# Patient Record
Sex: FEMALE | Race: WHITE | URBAN - METROPOLITAN AREA
[De-identification: names, ages, dates, MRNs, and addresses within clinical notes are randomized per-mention and may not be internally consistent; named-entity substitution may affect disease eponyms.]

---

## 2020-09-23 ENCOUNTER — WEB ENCOUNTER (OUTPATIENT)
Dept: URBAN - METROPOLITAN AREA CLINIC 13 | Facility: CLINIC | Age: 45
End: 2020-09-23

## 2020-09-25 ENCOUNTER — OFFICE VISIT (OUTPATIENT)
Dept: URBAN - METROPOLITAN AREA CLINIC 113 | Facility: CLINIC | Age: 45
End: 2020-09-25
Payer: COMMERCIAL

## 2020-09-25 VITALS
BODY MASS INDEX: 21.66 KG/M2 | HEART RATE: 88 BPM | TEMPERATURE: 98.6 F | DIASTOLIC BLOOD PRESSURE: 68 MMHG | SYSTOLIC BLOOD PRESSURE: 100 MMHG | HEIGHT: 65 IN | WEIGHT: 130 LBS

## 2020-09-25 DIAGNOSIS — K59.02 OBSTRUCTIVE DEFECATION: ICD-10-CM

## 2020-09-25 PROCEDURE — 99205 OFFICE O/P NEW HI 60 MIN: CPT | Performed by: INTERNAL MEDICINE

## 2020-09-25 RX ORDER — HYDROCORTISONE ACETATE 25 MG/1
1 APPLICATORFUL SUPPOSITORY RECTAL TWICE DAILY
Status: ACTIVE | COMMUNITY

## 2020-09-25 RX ORDER — PREGABALIN 75 MG/1
1 CAPSULE CAPSULE ORAL TWICE DAILY
Status: ACTIVE | COMMUNITY

## 2020-09-25 NOTE — HPI-TODAY'S VISIT:
Jd Pickett is a 45-year-old female who recently moved here from [facility].  She has a long and detailed, although somewhat confusing, GI history.  She states that she initially began having problems in 2004 when she was diagnosed with celiac disease.  She has been variously diagnosed as having gastroparesis and a sort of colitis in the past, although we have none of her outside records.  She is underwent appendectomy and cholecystectomy previously.  She has had 4 or 5 colonoscopies in the past, 1 of which showed an apparent finding of microscopic colitis.  Her current issues involve alternating constipation and diarrhea, with symptoms of obstructive defecation.  She will go to the bathroom 30 to 45 minutes at a time with a sensation of fecal urgency.  Sometimes, she will have a loose stool.  Other times, she will have no bowel movement feel as though she needs to go.  She will strain until she passes some stool, and this sometimes will result in some rectal bleeding.  She has a sensation of obstructed defecation, and in fact can have pain in her rectum so profound that it can radiate down into her upper legs.  The symptoms have been worse over the last year.  She has tried using a squatty potty, various fiber supplements, and a gluten-free diet, all of which have been unhelpful.  She feels that she has to wipe frequently, and frequent wiping will also resulted in rectal bleeding at times.  She has been directly administered steroid for this discomfort for the last year or so, and has had some relief from this. The patient also has had a periodic aching pain in the pelvis which shoots up into the rectum, lasting between 1/2-hour to 2 hours on occasion.  This is spontaneous, is not associated with defecation, and is not affected by food ingestion.  She denies any fever, chills, or sweats. The patient notes that the symptoms are not affected by food ingestion.  However, she has lost 28 pounds inadvertently since December 2018.  He is unclear why she has lost this weight.  She has been having some neurologic difficulties, with weakness in her lower extremities, etc., and is due to see a neurologist shortly.  She is said to have a brain and spine MRI in the interim as well.  She is concerned there may be an underlying neurologic problem.

## 2020-11-09 ENCOUNTER — TELEPHONE ENCOUNTER (OUTPATIENT)
Dept: URBAN - METROPOLITAN AREA CLINIC 113 | Facility: CLINIC | Age: 45
End: 2020-11-09

## 2021-01-20 ENCOUNTER — TELEPHONE ENCOUNTER (OUTPATIENT)
Dept: URBAN - METROPOLITAN AREA CLINIC 113 | Facility: CLINIC | Age: 46
End: 2021-01-20

## 2021-02-18 ENCOUNTER — WEB ENCOUNTER (OUTPATIENT)
Dept: URBAN - METROPOLITAN AREA CLINIC 107 | Facility: CLINIC | Age: 46
End: 2021-02-18

## 2021-02-18 ENCOUNTER — OFFICE VISIT (OUTPATIENT)
Dept: URBAN - METROPOLITAN AREA CLINIC 107 | Facility: CLINIC | Age: 46
End: 2021-02-18
Payer: COMMERCIAL

## 2021-02-18 VITALS
DIASTOLIC BLOOD PRESSURE: 71 MMHG | HEIGHT: 65 IN | WEIGHT: 142 LBS | SYSTOLIC BLOOD PRESSURE: 105 MMHG | TEMPERATURE: 98.4 F | HEART RATE: 91 BPM | BODY MASS INDEX: 23.66 KG/M2 | RESPIRATION RATE: 18 BRPM

## 2021-02-18 DIAGNOSIS — K62.3 RECTAL MUCOSA PROLAPSE: ICD-10-CM

## 2021-02-18 PROCEDURE — 99214 OFFICE O/P EST MOD 30 MIN: CPT | Performed by: NURSE PRACTITIONER

## 2021-02-18 RX ORDER — TRAMADOL HYDROCHLORIDE 50 MG/1
1 TABLET AS NEEDED TABLET, FILM COATED ORAL ONCE A DAY
Status: ACTIVE | COMMUNITY

## 2021-02-18 RX ORDER — PREGABALIN 75 MG/1
1 CAPSULE CAPSULE ORAL TWICE DAILY
Status: ACTIVE | COMMUNITY

## 2021-02-18 RX ORDER — TIZANIDINE 4 MG/1
1 TABLET AS NEEDED TABLET ORAL THREE TIMES A DAY
Status: ACTIVE | COMMUNITY

## 2021-02-18 RX ORDER — HYDROCORTISONE ACETATE 25 MG/1
1 APPLICATORFUL SUPPOSITORY RECTAL TWICE DAILY
Status: ON HOLD | COMMUNITY

## 2021-02-18 NOTE — PHYSICAL EXAM RECTAL:
Perianal bulge with valsalva progressing to opening of the anus and visible rectal tissue protruding; also noted prolapsing tissue from the vagina

## 2021-02-18 NOTE — HPI-TODAY'S VISIT:
This is a 45-year-old female with a history of osteoarthritis and possible IBS.   She was initially seen 9/25/2020.  She had recently moved from [place].  She reported a long and detailed GI history.  She reported a prior diagnosis of celiac disease, gastroparesis, and microscopic colitis.  At the time of her visit, her current issues involved alternating constipation and diarrhea with symptoms of obstructive defecation.  She had periodic aching in her pelvis which radiated to her rectum and an inadvertent weight loss.  She was having neurologic difficulties with weakness in her lower extremities and was scheduled to see a neurologist following imaging.  It was discussed that her history of obstructive defecation may be the result of a structural abnormality such as a neoplastic lesion in the colon or possibly a rectocele.  Pelvic floor dyssynergia was also a consideration.  Extensive outside records were requested.  She was to be referred to [facility] for anorectal manometry with biofeedback.  MR enterography or defogram were considerations. She was seen in the emergency department in November for rectal pain and pressure and pain in the left ankle.  She had a KUB that demonstrated moderate stool burden without obstruction. Records were received from [place] and are difficult to access at this time due to the large size of the file and efforts at printing were unsuccessful.   She is complaining of proctalgia.  She has constant pressure and pain in the rectal area.  She is describing prolapse.  She also reports being diagnosed with uterine prolapse.  She states she can feel her cervix at the opening of her vagina.  Her primary care physician has identified prolapse and has referred her to gynecology.  Currently, she does not have an appointment until April.  She is attempting to get an earlier evaluation.   She is having regular bowel movements.  She reports a constant sensation of the need to defecate.  If she sits to urinate, she experiences rectal pressure and a sensation of protrusion.  She is having small, narrow soft stools or is incontinent of liquid stool.  She does not want to change stool consistency at this point because it is difficult for her to evacuate.  Occasionally, she has noticed red blood on the tissue.  She has been prescribed tramadol for pain and this is ineffective.

## 2021-04-05 ENCOUNTER — OFFICE VISIT (OUTPATIENT)
Dept: URBAN - METROPOLITAN AREA CLINIC 113 | Facility: CLINIC | Age: 46
End: 2021-04-05

## 2021-04-19 ENCOUNTER — TELEPHONE ENCOUNTER (OUTPATIENT)
Dept: URBAN - METROPOLITAN AREA CLINIC 113 | Facility: CLINIC | Age: 46
End: 2021-04-19

## 2021-04-19 ENCOUNTER — LAB OUTSIDE AN ENCOUNTER (OUTPATIENT)
Dept: URBAN - METROPOLITAN AREA CLINIC 113 | Facility: CLINIC | Age: 46
End: 2021-04-19

## 2021-04-19 RX ORDER — VANCOMYCIN HYDROCHLORIDE 125 MG/1
AS DIRECTED CAPSULE ORAL
Qty: 30 | Refills: 2 | OUTPATIENT
Start: 2021-04-19 | End: 2021-05-19

## 2021-04-20 PROBLEM — 62315008 DIARRHEA: Status: ACTIVE | Noted: 2021-04-20

## 2021-04-28 LAB
C DIFFICILE TOXINS A+B, EIA: NEGATIVE
Lab: (no result)
WHITE BLOOD CELLS (WBC), STOOL: (no result)

## 2021-05-18 ENCOUNTER — WEB ENCOUNTER (OUTPATIENT)
Dept: URBAN - METROPOLITAN AREA CLINIC 113 | Facility: CLINIC | Age: 46
End: 2021-05-18

## 2021-05-18 ENCOUNTER — OFFICE VISIT (OUTPATIENT)
Dept: URBAN - METROPOLITAN AREA CLINIC 113 | Facility: CLINIC | Age: 46
End: 2021-05-18
Payer: COMMERCIAL

## 2021-05-18 VITALS
SYSTOLIC BLOOD PRESSURE: 97 MMHG | TEMPERATURE: 98 F | BODY MASS INDEX: 24.32 KG/M2 | DIASTOLIC BLOOD PRESSURE: 64 MMHG | WEIGHT: 146 LBS | HEART RATE: 89 BPM | RESPIRATION RATE: 18 BRPM | HEIGHT: 65 IN

## 2021-05-18 DIAGNOSIS — K59.09 OTHER CONSTIPATION: ICD-10-CM

## 2021-05-18 DIAGNOSIS — K62.89 PROCTALGIA: ICD-10-CM

## 2021-05-18 DIAGNOSIS — K52.839 MICROSCOPIC COLITIS, UNSPECIFIED MICROSCOPIC COLITIS TYPE: ICD-10-CM

## 2021-05-18 PROCEDURE — 99213 OFFICE O/P EST LOW 20 MIN: CPT | Performed by: INTERNAL MEDICINE

## 2021-05-18 RX ORDER — TRAMADOL HYDROCHLORIDE 50 MG/1
1 TABLET AS NEEDED TABLET, FILM COATED ORAL ONCE A DAY
Status: DISCONTINUED | COMMUNITY

## 2021-05-18 RX ORDER — BUDESONIDE 3 MG/1
2 CAPSULES CAPSULE, COATED PELLETS ORAL ONCE A DAY
Qty: 60 | Refills: 4 | OUTPATIENT

## 2021-05-18 RX ORDER — HYDROCORTISONE ACETATE 25 MG/1
1 APPLICATORFUL SUPPOSITORY RECTAL TWICE DAILY
Status: ON HOLD | COMMUNITY

## 2021-05-18 RX ORDER — VANCOMYCIN HYDROCHLORIDE 125 MG/1
AS DIRECTED CAPSULE ORAL
Qty: 30 | Refills: 2 | Status: DISCONTINUED | COMMUNITY
Start: 2021-04-19 | End: 2021-05-19

## 2021-05-18 RX ORDER — PREGABALIN 150 MG/1
1 CAPSULE CAPSULE ORAL ONCE A DAY
Status: ACTIVE | COMMUNITY

## 2021-05-18 RX ORDER — PREGABALIN 75 MG/1
1 CAPSULE CAPSULE ORAL TWICE DAILY
Status: DISCONTINUED | COMMUNITY

## 2021-05-18 RX ORDER — BUDESONIDE 3 MG/1
2 CAPSULES CAPSULE, COATED PELLETS ORAL ONCE A DAY
Status: ACTIVE | COMMUNITY

## 2021-05-18 RX ORDER — TIZANIDINE 4 MG/1
1 TABLET AS NEEDED TABLET ORAL THREE TIMES A DAY
Status: ACTIVE | COMMUNITY

## 2021-05-18 NOTE — HPI-TODAY'S VISIT:
This is a 46-year-old female with a history of celiac disease, gastroparesis, microscopic colitis, and proctalgia associated with rectal prolapse presenting for follow-up.   She was last seen 2/18/2021.  She was complaining of proctalgia reporting constant pressure and pain in the rectal area.  She was describing prolapse and reported to being diagnosed with uterine prolapse as well.  She had an upcoming appointment with gynecology in April.  She was having regular bowel movements but reported the constant sensation of the need to defecate.  She reported rectal pressure and a sensation of protrusion when she sat to urinate.  She was having small, narrow stools or was incontinent of liquid stool.  She had been taking tramadol for pain and reported it was ineffective.  Exam was notable for laxity of the anus and rectal prolapse with Valsalva.  Vaginal prolapse was also notable with Valsalva.  She was referred to Dr. Han who was able to see her that day for evaluation and possible surgical repair and instructed to proceed with GYN evaluation as well. She called our office 4/19/2021.  At the time, she was 3 weeks postop from hysterectomy and rectocele repair and had been experiencing diarrhea for 3 weeks.  Stool studies were ordered. Labs 4/23/2021:Stool for C. difficile and WBC negative. She underwent a hysterectomy and rectocele repair/pelvic repair by Dr. Aldana in late March.  She reports resolution of proctalgia.  She had postoperative diarrhea that was persistent.  Due to prior history of microscopic colitis, her primary care physician prescribed budesonide 3 mg 2 tablets daily 1 month ago.  She reports resolution of diarrhea.    She was recently diagnosed with herpes zoster involving the left side of her face and has experienced postherpetic neuralgia for which she is requiring oxycodone at night.  She is currently having a bowel movement every 2 or 3 days.  She denies hard stools or straining.  She denies red blood per rectum or melena.  She reports mild occasional nausea.  She denies any other abdominal symptoms.

## 2021-05-18 NOTE — HPI-OTHER HISTORIES
Prior records from [place] outline procedure history: Colonoscopy September 2019:Lymphocytic colitis EGD July 2017:Mild erythema of gastric antrum, histology normal. EGD March 2017:Gastric and duodenal biopsies normal. Colonoscopy and ileoscopy 2004:Normal ileal biopsies, lymphocytic colitis of colon. 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

## 2021-05-22 ENCOUNTER — DASHBOARD ENCOUNTERS (OUTPATIENT)
Age: 46
End: 2021-05-22

## 2021-05-22 PROBLEM — 14760008: Status: ACTIVE | Noted: 2021-05-22

## 2021-05-22 PROBLEM — 77880009: Status: ACTIVE | Noted: 2021-05-22

## 2021-05-22 PROBLEM — 235753003: Status: ACTIVE | Noted: 2021-05-18

## 2021-08-18 ENCOUNTER — OFFICE VISIT (OUTPATIENT)
Dept: URBAN - METROPOLITAN AREA CLINIC 113 | Facility: CLINIC | Age: 46
End: 2021-08-18

## 2021-10-10 ENCOUNTER — TELEPHONE ENCOUNTER (OUTPATIENT)
Dept: URBAN - METROPOLITAN AREA CLINIC 113 | Facility: CLINIC | Age: 46
End: 2021-10-10